# Patient Record
Sex: MALE | Race: WHITE | NOT HISPANIC OR LATINO | Employment: FULL TIME | URBAN - METROPOLITAN AREA
[De-identification: names, ages, dates, MRNs, and addresses within clinical notes are randomized per-mention and may not be internally consistent; named-entity substitution may affect disease eponyms.]

---

## 2018-12-31 ENCOUNTER — HOSPITAL ENCOUNTER (EMERGENCY)
Facility: MEDICAL CENTER | Age: 48
End: 2018-12-31
Attending: EMERGENCY MEDICINE
Payer: COMMERCIAL

## 2018-12-31 ENCOUNTER — APPOINTMENT (OUTPATIENT)
Dept: RADIOLOGY | Facility: MEDICAL CENTER | Age: 48
End: 2018-12-31
Attending: EMERGENCY MEDICINE
Payer: COMMERCIAL

## 2018-12-31 VITALS
HEART RATE: 78 BPM | BODY MASS INDEX: 33.2 KG/M2 | SYSTOLIC BLOOD PRESSURE: 123 MMHG | HEIGHT: 75 IN | WEIGHT: 266.98 LBS | OXYGEN SATURATION: 94 % | TEMPERATURE: 99.2 F | DIASTOLIC BLOOD PRESSURE: 81 MMHG | RESPIRATION RATE: 16 BRPM

## 2018-12-31 DIAGNOSIS — J18.9 PNEUMONIA DUE TO ORGANISM: ICD-10-CM

## 2018-12-31 LAB
ANION GAP SERPL CALC-SCNC: 12 MMOL/L (ref 0–11.9)
BASOPHILS # BLD AUTO: 0.4 % (ref 0–1.8)
BASOPHILS # BLD: 0.03 K/UL (ref 0–0.12)
BUN SERPL-MCNC: 17 MG/DL (ref 8–22)
CALCIUM SERPL-MCNC: 9.3 MG/DL (ref 8.5–10.5)
CHLORIDE SERPL-SCNC: 100 MMOL/L (ref 96–112)
CO2 SERPL-SCNC: 20 MMOL/L (ref 20–33)
CREAT SERPL-MCNC: 1.05 MG/DL (ref 0.5–1.4)
D DIMER PPP IA.FEU-MCNC: 3.51 UG/ML (FEU) (ref 0–0.5)
EKG IMPRESSION: NORMAL
EOSINOPHIL # BLD AUTO: 0.05 K/UL (ref 0–0.51)
EOSINOPHIL NFR BLD: 0.6 % (ref 0–6.9)
ERYTHROCYTE [DISTWIDTH] IN BLOOD BY AUTOMATED COUNT: 38.5 FL (ref 35.9–50)
FLUAV RNA SPEC QL NAA+PROBE: NEGATIVE
FLUBV RNA SPEC QL NAA+PROBE: NEGATIVE
GLUCOSE SERPL-MCNC: 106 MG/DL (ref 65–99)
HCT VFR BLD AUTO: 39.1 % (ref 42–52)
HGB BLD-MCNC: 14.4 G/DL (ref 14–18)
IMM GRANULOCYTES # BLD AUTO: 0.02 K/UL (ref 0–0.11)
IMM GRANULOCYTES NFR BLD AUTO: 0.2 % (ref 0–0.9)
LACTATE BLD-SCNC: 1 MMOL/L (ref 0.5–2)
LYMPHOCYTES # BLD AUTO: 1.84 K/UL (ref 1–4.8)
LYMPHOCYTES NFR BLD: 22 % (ref 22–41)
MCH RBC QN AUTO: 32.8 PG (ref 27–33)
MCHC RBC AUTO-ENTMCNC: 36.8 G/DL (ref 33.7–35.3)
MCV RBC AUTO: 89.1 FL (ref 81.4–97.8)
MONOCYTES # BLD AUTO: 1.01 K/UL (ref 0–0.85)
MONOCYTES NFR BLD AUTO: 12.1 % (ref 0–13.4)
NEUTROPHILS # BLD AUTO: 5.41 K/UL (ref 1.82–7.42)
NEUTROPHILS NFR BLD: 64.7 % (ref 44–72)
NRBC # BLD AUTO: 0 K/UL
NRBC BLD-RTO: 0 /100 WBC
PLATELET # BLD AUTO: 162 K/UL (ref 164–446)
PMV BLD AUTO: 9.8 FL (ref 9–12.9)
POTASSIUM SERPL-SCNC: 3.6 MMOL/L (ref 3.6–5.5)
RBC # BLD AUTO: 4.39 M/UL (ref 4.7–6.1)
SODIUM SERPL-SCNC: 132 MMOL/L (ref 135–145)
TROPONIN I SERPL-MCNC: <0.01 NG/ML (ref 0–0.04)
WBC # BLD AUTO: 8.4 K/UL (ref 4.8–10.8)

## 2018-12-31 PROCEDURE — 71275 CT ANGIOGRAPHY CHEST: CPT

## 2018-12-31 PROCEDURE — 87040 BLOOD CULTURE FOR BACTERIA: CPT | Mod: 91

## 2018-12-31 PROCEDURE — 83605 ASSAY OF LACTIC ACID: CPT

## 2018-12-31 PROCEDURE — 85379 FIBRIN DEGRADATION QUANT: CPT

## 2018-12-31 PROCEDURE — A9270 NON-COVERED ITEM OR SERVICE: HCPCS | Performed by: EMERGENCY MEDICINE

## 2018-12-31 PROCEDURE — 87502 INFLUENZA DNA AMP PROBE: CPT

## 2018-12-31 PROCEDURE — 96365 THER/PROPH/DIAG IV INF INIT: CPT

## 2018-12-31 PROCEDURE — 700117 HCHG RX CONTRAST REV CODE 255: Performed by: EMERGENCY MEDICINE

## 2018-12-31 PROCEDURE — 85025 COMPLETE CBC W/AUTO DIFF WBC: CPT

## 2018-12-31 PROCEDURE — 80048 BASIC METABOLIC PNL TOTAL CA: CPT

## 2018-12-31 PROCEDURE — 96367 TX/PROPH/DG ADDL SEQ IV INF: CPT

## 2018-12-31 PROCEDURE — 36415 COLL VENOUS BLD VENIPUNCTURE: CPT

## 2018-12-31 PROCEDURE — 93005 ELECTROCARDIOGRAM TRACING: CPT | Performed by: EMERGENCY MEDICINE

## 2018-12-31 PROCEDURE — 84484 ASSAY OF TROPONIN QUANT: CPT

## 2018-12-31 PROCEDURE — 99285 EMERGENCY DEPT VISIT HI MDM: CPT

## 2018-12-31 PROCEDURE — 700105 HCHG RX REV CODE 258: Performed by: EMERGENCY MEDICINE

## 2018-12-31 PROCEDURE — 700102 HCHG RX REV CODE 250 W/ 637 OVERRIDE(OP): Performed by: EMERGENCY MEDICINE

## 2018-12-31 PROCEDURE — 700111 HCHG RX REV CODE 636 W/ 250 OVERRIDE (IP): Performed by: EMERGENCY MEDICINE

## 2018-12-31 PROCEDURE — 71046 X-RAY EXAM CHEST 2 VIEWS: CPT

## 2018-12-31 RX ORDER — DOXYCYCLINE 100 MG/1
100 CAPSULE ORAL 2 TIMES DAILY
Qty: 10 CAP | Refills: 0 | Status: SHIPPED | OUTPATIENT
Start: 2018-12-31 | End: 2019-01-05

## 2018-12-31 RX ORDER — IBUPROFEN 600 MG/1
600 TABLET ORAL ONCE
Status: COMPLETED | OUTPATIENT
Start: 2018-12-31 | End: 2018-12-31

## 2018-12-31 RX ORDER — SODIUM CHLORIDE 9 MG/ML
1000 INJECTION, SOLUTION INTRAVENOUS ONCE
Status: COMPLETED | OUTPATIENT
Start: 2018-12-31 | End: 2018-12-31

## 2018-12-31 RX ORDER — ACETAMINOPHEN 325 MG/1
650 TABLET ORAL ONCE
Status: COMPLETED | OUTPATIENT
Start: 2018-12-31 | End: 2018-12-31

## 2018-12-31 RX ORDER — AZITHROMYCIN 500 MG/1
500 INJECTION, POWDER, LYOPHILIZED, FOR SOLUTION INTRAVENOUS ONCE
Status: COMPLETED | OUTPATIENT
Start: 2018-12-31 | End: 2018-12-31

## 2018-12-31 RX ADMIN — AZITHROMYCIN MONOHYDRATE 500 MG: 500 INJECTION, POWDER, LYOPHILIZED, FOR SOLUTION INTRAVENOUS at 20:20

## 2018-12-31 RX ADMIN — IBUPROFEN 600 MG: 600 TABLET, FILM COATED ORAL at 18:15

## 2018-12-31 RX ADMIN — SODIUM CHLORIDE 3 G: 900 INJECTION INTRAVENOUS at 19:38

## 2018-12-31 RX ADMIN — IOHEXOL 65 ML: 350 INJECTION, SOLUTION INTRAVENOUS at 20:13

## 2018-12-31 RX ADMIN — ACETAMINOPHEN 650 MG: 325 TABLET, FILM COATED ORAL at 18:15

## 2018-12-31 RX ADMIN — SODIUM CHLORIDE 1000 ML: 9 INJECTION, SOLUTION INTRAVENOUS at 18:16

## 2018-12-31 ASSESSMENT — PAIN SCALES - GENERAL: PAINLEVEL_OUTOF10: 6

## 2019-01-01 NOTE — ED NOTES
D/C instructions provided to patient at bedside, verbalizes understanding and states plans for follow-up with PCP as recommended.   Scripts given  IV's removed and dressed.   All belongings accounted for, all questions answered at this time.   Family to drive patient home.

## 2019-01-01 NOTE — ED NOTES
Pt reports being seen by ED in [place] where they told him his lungs were clear and it was a virus and if he took mucinex and dayquill it'd work its self out fine.

## 2019-01-01 NOTE — ED NOTES
"Pt reports \"severly dehydrated, sick for a week, blood in my urine\" ... \"just a whole lot of things\"   "

## 2019-01-01 NOTE — ED PROVIDER NOTES
"ED Provider Note    Scribed for Ori Shirley M.D. by Dana Laird. 12/31/2018, 5:32 PM.    Primary care provider: No primary care provider on file.  Means of arrival: walk in   History obtained from: Patient  History limited by: none     CHIEF COMPLAINT  Chief Complaint   Patient presents with   • Cough     productive cough since willie. states coughing up greenish sputum.   • Nasal Congestion   • Muscle Ache       HPI  Nehemiah Abbasi is a 48 y.o. male who presents to the ED with complaints of an acute, persistent cough onset 5 days ago. He reports that he was seen at an emergency department in [place] with symptoms of cough, nasal congestion, shortness of breath, wheezing and chest pains. There he had a normal EKG and chest x-ray and was told he had a viral syndrome. The physician there instructed him to take Mucinex and Nyquil. Since then, his symptoms have worsened and he has new symptoms of headaches, hematuria, increased thirst and increased fatigue. The patient notes that he feels dehydrated. He is a smoker. The patient adds that he has been drug free for 11 years. Recent long distance travel by car from [place] is noted.     REVIEW OF SYSTEMS  See HPI for further details. All other systems are negative.     PAST MEDICAL HISTORY       SURGICAL HISTORY  patient denies any surgical history    SOCIAL HISTORY  Social History   Substance Use Topics   • Smoking status: Not on file   • Smokeless tobacco: Not on file   • Alcohol use Not on file      History   Drug use: Unknown       FAMILY HISTORY  No family history on file.    CURRENT MEDICATIONS  Reviewed.  See Encounter Summary.     ALLERGIES  No Known Allergies    PHYSICAL EXAM  VITAL SIGNS: /81   Pulse (!) 104   Temp 37.3 °C (99.2 °F) (Temporal)   Resp 18   Ht 1.9 m (6' 2.8\")   Wt 121.1 kg (266 lb 15.6 oz)   SpO2 92%   BMI 33.55 kg/m²   Constitutional: Alert in no apparent distress.  HENT: No signs of trauma, Bilateral external ears " normal, Nasal congestion   Eyes: Pupils are equal and reactive, Conjunctiva normal, Non-icteric.   Neck: Normal range of motion, No tenderness, Supple, No stridor.   Lymphatic: No lymphadenopathy noted.   Cardiovascular: Regular rate and rhythm, no murmurs.   Thorax & Lungs: Scattered rhonchi and wheezing, No respiratory distress, No chest tenderness.   Abdomen: Bowel sounds normal, Soft, No tenderness, No masses, No pulsatile masses. No peritoneal signs.  Skin: Warm, Dry, No erythema, No rash.   Back: No bony tenderness, No CVA tenderness.   Extremities: Intact distal pulses, No edema, No tenderness, No cyanosis  Musculoskeletal: Good range of motion in all major joints. No tenderness to palpation or major deformities noted.   Neurologic: Alert , Normal motor function, Normal sensory function, No focal deficits noted.   Psychiatric: Affect normal, Judgment normal, Mood normal.     DIAGNOSTIC STUDIES / PROCEDURES     LABS  Results for orders placed or performed during the hospital encounter of 12/31/18   CBC WITH DIFFERENTIAL   Result Value Ref Range    WBC 8.4 4.8 - 10.8 K/uL    RBC 4.39 (L) 4.70 - 6.10 M/uL    Hemoglobin 14.4 14.0 - 18.0 g/dL    Hematocrit 39.1 (L) 42.0 - 52.0 %    MCV 89.1 81.4 - 97.8 fL    MCH 32.8 27.0 - 33.0 pg    MCHC 36.8 (H) 33.7 - 35.3 g/dL    RDW 38.5 35.9 - 50.0 fL    Platelet Count 162 (L) 164 - 446 K/uL    MPV 9.8 9.0 - 12.9 fL    Neutrophils-Polys 64.70 44.00 - 72.00 %    Lymphocytes 22.00 22.00 - 41.00 %    Monocytes 12.10 0.00 - 13.40 %    Eosinophils 0.60 0.00 - 6.90 %    Basophils 0.40 0.00 - 1.80 %    Immature Granulocytes 0.20 0.00 - 0.90 %    Nucleated RBC 0.00 /100 WBC    Neutrophils (Absolute) 5.41 1.82 - 7.42 K/uL    Lymphs (Absolute) 1.84 1.00 - 4.80 K/uL    Monos (Absolute) 1.01 (H) 0.00 - 0.85 K/uL    Eos (Absolute) 0.05 0.00 - 0.51 K/uL    Baso (Absolute) 0.03 0.00 - 0.12 K/uL    Immature Granulocytes (abs) 0.02 0.00 - 0.11 K/uL    NRBC (Absolute) 0.00 K/uL   BASIC  METABOLIC PANEL   Result Value Ref Range    Sodium 132 (L) 135 - 145 mmol/L    Potassium 3.6 3.6 - 5.5 mmol/L    Chloride 100 96 - 112 mmol/L    Co2 20 20 - 33 mmol/L    Glucose 106 (H) 65 - 99 mg/dL    Bun 17 8 - 22 mg/dL    Creatinine 1.05 0.50 - 1.40 mg/dL    Calcium 9.3 8.5 - 10.5 mg/dL    Anion Gap 12.0 (H) 0.0 - 11.9   TROPONIN   Result Value Ref Range    Troponin I <0.01 0.00 - 0.04 ng/mL   D-DIMER   Result Value Ref Range    D-Dimer Screen 3.51 (H) 0.00 - 0.50 ug/mL (FEU)   Influenza A/B By PCR   Result Value Ref Range    Influenza virus A RNA Negative Negative    Influenza virus B, PCR Negative Negative   ESTIMATED GFR   Result Value Ref Range    GFR If African American >60 >60 mL/min/1.73 m 2    GFR If Non African American >60 >60 mL/min/1.73 m 2   LACTIC ACID   Result Value Ref Range    Lactic Acid 1.0 0.5 - 2.0 mmol/L   EKG (NOW)   Result Value Ref Range    Report       [facility]    Test Date:  2018  Pt Name:    SHAUN GONZALEZ               Department: ER  MRN:        [id number]                      Room:       [facility]  Gender:     Male                         Technician: [id number]  :        1970                   Requested By:MARS LEYVA  Order #:    [id number]                    Reading MD:    Measurements  Intervals                                Axis  Rate:       87                           P:          63  FL:         172                          QRS:        68  QRSD:       90                           T:          36  QT:         332  QTc:        400    Interpretive Statements  SINUS RHYTHM  No previous ECG available for comparison         All labs were reviewed by me.    EKG  12 Lead EKG interpreted by me to show:  Sinus rhythm  Rate 87  Axis: Normal  Intervals: Normal  Diffuse non specific ST-T wave changes  S1, flat T3  No prior EKG for comparison     RADIOLOGY  CT-CTA CHEST PULMONARY ARTERY W/ RECONS   Final Result      1.  Limited evaluation of the  lower lobes secondary to respiratory motion.      2.  No pulmonary embolus is identified.      3.  Peribronchial thickening/inflammation with reticulation and groundglass density in the lower lobes, consistent with an infectious or inflammatory process.      4.  4 mm left upper lobe nodule. If the patient is at high risk for malignancy, consider follow-up low dose CT in 12 months      5.  Nonspecific mediastinal and right hilar lymph node enlargement, likely reactive.      6.  Small hiatal hernia.      7.  Hepatic steatosis            DX-CHEST-2 VIEWS   Final Result         Patchy bibasilar opacities, left more than right, concerning for infection.        The radiologist's interpretation of all radiological studies and images have been reviewed by me.    COURSE & MEDICAL DECISION MAKING  Pertinent Labs & Imaging studies reviewed. (See chart for details)      5:32 PM - Patient seen and examined at bedside. Patient will be treated with tylenol 650 mg, motrin 600 mg. Ordered troponin, d-dimer, influenza a/b, DX chest, EKG, CBC with differential, BMP to evaluate his symptoms.     HYDRATION: Based on the patient's presentation of Tachycardia and dehydration the patient was given IV fluids. IV Hydration was used because oral hydration was not adequate alone. Upon recheck following hydration, the patient was still feeling unwell with fevers and chills.      6:56 PM - I reevaluated patient at bedside and discussed diagnostic study results. I also discussed the plan for CT chest . I also ordered Unasyn 3 g and Zithromax 500 mg.     9:17 PM - I reevaluated patient at bedside and discussed diagnostic study results. I also discussed the plan for discharge as outlined below.       Decision Making:  This is a 48 y.o. year old male who presents with above complaint.  Progressively worsening symptoms as per presenting back to the emergency department for reevaluation.  Seen in [place] and told likely viral syndrome.  Today  evidence of bibasilar pneumonia.  Given elevated d-dimer [place] I did complete a CT which did not show any evidence of pulmonary embolus.  Likely infectious process again identified.  Furthermore the patient had a nodule which she is understanding he will need to follow-up with.  She is attending outpatient follow-up both for this current illness as well as the pulmonary nodule for screening.    FINAL IMPRESSION  1. Pneumonia due to organism        PRESCRIPTIONS  Discharge Medication List as of 12/31/2018  9:16 PM      START taking these medications    Details   doxycycline (MONODOX) 100 MG capsule Take 1 Cap by mouth 2 times a day for 5 days., Disp-10 Cap, R-0, Print Rx Paper             FOLLOW UP  [facility], Emergency Dept  [address] [address] [phone]    If symptoms worsen; f/u with Dr. Gross        -DISCHARGE-      FINAL IMPRESSION  1. Pneumonia due to organism       2.  Pulmonary nodule     I, Dana Laird (Scribe), am scribing for, and in the presence of, Ori Shirley M.D..    Electronically signed by: Dana Laird (Scribe), 12/31/2018    I, Ori Shirley M.D. personally performed the services described in this documentation, as scribed by Dana Laird in my presence, and it is both accurate and complete.    C    The note accurately reflects work and decisions made by me.  Ori Shirley  1/1/2019  12:19 AM

## 2019-01-01 NOTE — ED NOTES
Pt ambulated to restroom with steady gait. Pt maintained O2 saturation between 90-94% on RA. Pt denies SOB.  Pt placed back on monitor and reconnected to Azithromycin drip (see MAR).

## 2019-01-01 NOTE — ED TRIAGE NOTES
Chief Complaint   Patient presents with   • Cough     productive cough since willie. states coughing up greenish sputum.   • Nasal Congestion   • Muscle Ache     Also states that he feels dehydrated and states urine appears w/a little bit of blood. Denies dysuria. Triage process explained. Instructed to notify staff for any worsening symptoms. NAD. Able to speak in full sentences.

## 2019-01-01 NOTE — ED NOTES
Patient returned from imaging  NS and unasyn stopped.   Azithromycin started (see MAR).   No other needs at this time.

## 2019-01-05 LAB
BACTERIA BLD CULT: NORMAL
BACTERIA BLD CULT: NORMAL
SIGNIFICANT IND 70042: NORMAL
SIGNIFICANT IND 70042: NORMAL
SITE SITE: NORMAL
SITE SITE: NORMAL
SOURCE SOURCE: NORMAL
SOURCE SOURCE: NORMAL

## 2021-06-05 ENCOUNTER — HOSPITAL ENCOUNTER (EMERGENCY)
Facility: MEDICAL CENTER | Age: 51
End: 2021-06-05
Attending: EMERGENCY MEDICINE
Payer: COMMERCIAL

## 2021-06-05 VITALS
SYSTOLIC BLOOD PRESSURE: 117 MMHG | DIASTOLIC BLOOD PRESSURE: 71 MMHG | HEART RATE: 59 BPM | TEMPERATURE: 97.3 F | HEIGHT: 74 IN | OXYGEN SATURATION: 96 % | WEIGHT: 270 LBS | RESPIRATION RATE: 16 BRPM | BODY MASS INDEX: 34.65 KG/M2

## 2021-06-05 DIAGNOSIS — S39.012A STRAIN OF LUMBAR REGION, INITIAL ENCOUNTER: ICD-10-CM

## 2021-06-05 PROCEDURE — 96375 TX/PRO/DX INJ NEW DRUG ADDON: CPT

## 2021-06-05 PROCEDURE — 700111 HCHG RX REV CODE 636 W/ 250 OVERRIDE (IP): Performed by: EMERGENCY MEDICINE

## 2021-06-05 PROCEDURE — 99285 EMERGENCY DEPT VISIT HI MDM: CPT

## 2021-06-05 PROCEDURE — 96374 THER/PROPH/DIAG INJ IV PUSH: CPT

## 2021-06-05 RX ORDER — PREDNISONE 20 MG/1
60 TABLET ORAL DAILY
Status: DISCONTINUED | OUTPATIENT
Start: 2021-06-05 | End: 2021-06-05 | Stop reason: HOSPADM

## 2021-06-05 RX ORDER — VARENICLINE TARTRATE 1 MG/1
1 TABLET, FILM COATED ORAL 2 TIMES DAILY
COMMUNITY

## 2021-06-05 RX ORDER — PREDNISONE 20 MG/1
40 TABLET ORAL DAILY
Qty: 10 TABLET | Refills: 0 | Status: SHIPPED | OUTPATIENT
Start: 2021-06-05 | End: 2021-06-10

## 2021-06-05 RX ORDER — KETOROLAC TROMETHAMINE 30 MG/ML
30 INJECTION, SOLUTION INTRAMUSCULAR; INTRAVENOUS ONCE
Status: COMPLETED | OUTPATIENT
Start: 2021-06-05 | End: 2021-06-05

## 2021-06-05 RX ORDER — HYDROMORPHONE HYDROCHLORIDE 1 MG/ML
1 INJECTION, SOLUTION INTRAMUSCULAR; INTRAVENOUS; SUBCUTANEOUS ONCE
Status: COMPLETED | OUTPATIENT
Start: 2021-06-05 | End: 2021-06-05

## 2021-06-05 RX ORDER — OXYCODONE AND ACETAMINOPHEN 10; 325 MG/1; MG/1
1 TABLET ORAL EVERY 6 HOURS PRN
Qty: 16 TABLET | Refills: 0 | Status: SHIPPED | OUTPATIENT
Start: 2021-06-05 | End: 2021-06-09

## 2021-06-05 RX ADMIN — HYDROMORPHONE HYDROCHLORIDE 1 MG: 1 INJECTION, SOLUTION INTRAMUSCULAR; INTRAVENOUS; SUBCUTANEOUS at 12:55

## 2021-06-05 RX ADMIN — KETOROLAC TROMETHAMINE 30 MG: 30 INJECTION, SOLUTION INTRAMUSCULAR; INTRAVENOUS at 12:55

## 2021-06-05 RX ADMIN — PREDNISONE 60 MG: 20 TABLET ORAL at 12:55

## 2021-06-05 ASSESSMENT — ENCOUNTER SYMPTOMS
HEADACHES: 0
FEVER: 0
ABDOMINAL PAIN: 0
NAUSEA: 0
BACK PAIN: 1
VOMITING: 0
DIZZINESS: 0
SHORTNESS OF BREATH: 0
FALLS: 0
COUGH: 1

## 2021-06-05 NOTE — ED PROVIDER NOTES
"ED Provider Note    ED Provider Note    Primary care provider: No primary care provider on file.  Means of arrival: EMS  History obtained from: Patient  History limited by: None    CHIEF COMPLAINT  Chief Complaint   Patient presents with   • Low Back Pain     right sided,  \"heard popping\" after lifting heavy furiture yesterday       HPI  Nehemiah Abbasi is a 51 y.o. male who presents to the Emergency Department via EMS with a chief complaint of back pain, this is low back pain, right greater than left.  He has a history of similar symptoms although he is never has been in the emergency department for pain.  He states 30 years ago, he fell off a 12 foot ladder and injured his back and he has had problems since then but he manages them at home.  He often will do certain things and he will know that he can expect to be sore for the next few days.  Typically, he is able to slow down, rest and do some gentle range of motion and that will be all that he needs.  He has made use of anti-inflammatories in the past.  Yesterday, he was at his workplace and he states that a large, open cabinet with liquor on it, fell onto an employee and he reflexively grabbed it to prevent the employee being injured and he he heard a \"pop in his back\" and knew that he was going to have problems with pain.  He does a lot of driving for work.  Yesterday per usual, he got back into his car to drive home which typically takes about 3 hours and he states it took an additional 2 hours because he had to stop frequently and get out and walk around.  Patient was in his normal state of health prior to this incident.  He states he has a chronic cough due to his smoking and that he always has \"tingling in his throat\" this is unchanged.  He denies fever cough or cold symptoms.  He denies bowel or bladder incontinence or retention.  He does not have radiation of pain into his legs.  He reports initially, his pain was 12/10, after medicated by EMS, " "decreased to 5 out of 10 and now, he states is about 7-8 out of 10 and increases with any movement or cough.  He was unable to stand at home secondary to pain, prompting a call to 911.    REVIEW OF SYSTEMS  Review of Systems   Constitutional: Negative for fever.   HENT: Negative for congestion.    Respiratory: Positive for cough. Negative for shortness of breath.         Chronic   Gastrointestinal: Negative for abdominal pain, nausea and vomiting.   Genitourinary: Negative for dysuria and urgency.   Musculoskeletal: Positive for back pain. Negative for falls.   Neurological: Negative for dizziness and headaches.       PAST MEDICAL HISTORY   Chronic intermittent back pain, chronic smoker's cough    SURGICAL HISTORY   has a past surgical history that includes carpal tunnel release.    SOCIAL HISTORY  Social History     Tobacco Use   • Smoking status: Current Every Day Smoker     Types: Cigarettes   • Smokeless tobacco: Never Used   Vaping Use   • Vaping Use: Never used   Substance Use Topics   • Alcohol use: Not Currently   • Drug use: Not Currently      Social History     Substance and Sexual Activity   Drug Use Not Currently       FAMILY HISTORY  History reviewed. No pertinent family history.    CURRENT MEDICATIONS  Home Medications     Reviewed by Nicole Guerrero R.N. (Registered Nurse) on 06/05/21 at 1110  Med List Status: Partial   Medication Last Dose Status   varenicline (CHANTIX CONTINUING MONTH SAUD) 1 MG tablet  Active                ALLERGIES  No Known Allergies    PHYSICAL EXAM  VITAL SIGNS: /71   Pulse (!) 59   Temp 36.3 °C (97.3 °F) (Temporal)   Resp 16   Ht 1.88 m (6' 2\")   Wt 122 kg (270 lb)   SpO2 96%   BMI 34.67 kg/m²   Vitals reviewed.  Constitutional: Patient is oriented to person, place, and time. Appears well-developed and well-nourished.  Mild distress.    Head: Normocephalic and atraumatic.  Mouth/Throat: Oropharynx is clear and moist  Eyes: Conjunctivae are normal. Pupils are " equal and round.  Neck: Normal range of motion. Neck supple.  Cardiovascular: Normal rate, regular rhythm and normal heart sounds. Normal peripheral pulses, bilateral lower extremities.  Pulmonary/Chest: Effort normal and breath sounds normal. No respiratory distress, no wheezes, rhonchi, or rales.   Abdominal: Soft. Bowel sounds are normal. There is no tenderness. No rebound or guarding, or peritoneal signs. No CVA tenderness.  Musculoskeletal: No edema and no tenderness. Tenderness to palpation without overlying skin changes to the right lumbar paraspinal muscles.  There is also tenderness to palpation over the upper right buttock, and to a lesser extent these areas on the left side.  There is a positive straight leg raise on the right at about 15 degrees.  The left patient is able to flex up with straight leg raise to approximately 60 degrees.    Neurological: No focal deficits. No motor deficits. Tingling, paresthesias to bilateral feet, unchanged  Skin: Skin is warm and dry. No erythema. No pallor.   Psychiatric: Patient has a normal mood and affect.     COURSE & MEDICAL DECISION MAKING  Pertinent Labs & Imaging studies reviewed. (See chart for details)    Obtained and reviewed past medical records. Patients last encounter was in 2018 he was seen for cough and nasal congestion with muscle aches. He was diagnosed with pneumonia.    11:26 AM - Patient seen and examined at bedside.  This is a pleasant 10 overall previously healthy 51-year-old male who presents via EMS as he was unable to stand up and walk at home due to severe pain.  He had some relief with morphine.  He does not have any bowel or bladder incontinence.  No significant trauma to suggest need for imaging for fracture.  I suspect this is likely soft tissue.  He does not have red flag symptoms to suggest infectious etiology or cauda equina syndrome.  He will be treated with anti-inflammatories and pain medication.    2:45 PM patient's reevaluated the  bedside.  He was able to walk, get up out of bed on his own here in the emergency department and is feeling markedly better.  At this point, I feel he can safely be discharged home.  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  He will be discharged home with a short course of pain medication.   was checked.  There is no concerning prescription filling pattern.  He will also be discharged home with a short course of prednisone.  He is advised on avoiding heavy lifting or bending and applying ice to the area, avoiding heat.  At this point, I do not see indication for emergent imaging.  However, he is given strict return precautions.  If this changes if he develops a fever or new neurologic deficits, changes in his bowel or bladder control, he should return immediately for reevaluation and he is agreeable to this plan of care.    FINAL IMPRESSION  1. Strain of lumbar region, initial encounter

## 2021-06-05 NOTE — ED TRIAGE NOTES
"Nehemiah Abbasi  Chief Complaint   Patient presents with   • Low Back Pain     right sided,  \"heard popping\" after lifting heavy furiture yesterday     Pt biba from home,  Was unable to stand this morning after hearing a \"popping\" sounds in back last night while lifting heavy furniture.  CMS intact,  Pain with movement.  5mg IV morphine given PTA.      On monitor.  Chart up for ERP.    Call light in reach  "

## 2021-06-05 NOTE — ED NOTES
Assumed care of pt, pt medicated as ordered, Continuous pulse ox in place, pt updated on plan of care

## 2021-06-05 NOTE — ED NOTES
Pt discharged with instructions and script. X 2.  Pt verbalizes the understanding of instructions. Pt ambulated out of ER without any difficulty. Pt dc'd to wife

## 2021-06-05 NOTE — DISCHARGE INSTRUCTIONS
I would recommend, decreased activity for the next 3 days.  Gentle range of motion exercises.  No long periods of sitting or standing.

## 2021-08-13 ENCOUNTER — APPOINTMENT (OUTPATIENT)
Dept: RADIOLOGY | Facility: MEDICAL CENTER | Age: 51
End: 2021-08-13
Attending: EMERGENCY MEDICINE
Payer: COMMERCIAL

## 2021-08-13 ENCOUNTER — HOSPITAL ENCOUNTER (EMERGENCY)
Facility: MEDICAL CENTER | Age: 51
End: 2021-08-13
Attending: EMERGENCY MEDICINE
Payer: COMMERCIAL

## 2021-08-13 VITALS
BODY MASS INDEX: 35.94 KG/M2 | WEIGHT: 280 LBS | SYSTOLIC BLOOD PRESSURE: 114 MMHG | OXYGEN SATURATION: 94 % | TEMPERATURE: 97.5 F | RESPIRATION RATE: 18 BRPM | DIASTOLIC BLOOD PRESSURE: 56 MMHG | HEART RATE: 72 BPM | HEIGHT: 74 IN

## 2021-08-13 DIAGNOSIS — R91.1 LUNG NODULE: ICD-10-CM

## 2021-08-13 DIAGNOSIS — D69.6 THROMBOCYTOPENIA (HCC): ICD-10-CM

## 2021-08-13 DIAGNOSIS — K76.0 HEPATIC STEATOSIS: ICD-10-CM

## 2021-08-13 DIAGNOSIS — R73.09 ELEVATED GLUCOSE: ICD-10-CM
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
## 2021-08-13 LAB
ABO GROUP BLD: NORMAL
ALBUMIN SERPL BCP-MCNC: 4.1 G/DL (ref 3.2–4.9)
ALBUMIN/GLOB SERPL: 1.3 G/DL
ALP SERPL-CCNC: 69 U/L (ref 30–99)
ALT SERPL-CCNC: 37 U/L (ref 2–50)
ANION GAP SERPL CALC-SCNC: 11 MMOL/L (ref 7–16)
APTT PPP: 24.6 SEC (ref 24.7–36)
AST SERPL-CCNC: 27 U/L (ref 12–45)
BILIRUB SERPL-MCNC: 0.6 MG/DL (ref 0.1–1.5)
BLD GP AB SCN SERPL QL: NORMAL
BUN SERPL-MCNC: 19 MG/DL (ref 8–22)
CALCIUM SERPL-MCNC: 9 MG/DL (ref 8.5–10.5)
CHLORIDE SERPL-SCNC: 105 MMOL/L (ref 96–112)
CO2 SERPL-SCNC: 22 MMOL/L (ref 20–33)
CREAT SERPL-MCNC: 1.19 MG/DL (ref 0.5–1.4)
ERYTHROCYTE [DISTWIDTH] IN BLOOD BY AUTOMATED COUNT: 45.3 FL (ref 35.9–50)
ETHANOL BLD-MCNC: <10.1 MG/DL (ref 0–10)
GLOBULIN SER CALC-MCNC: 3.1 G/DL (ref 1.9–3.5)
GLUCOSE SERPL-MCNC: 149 MG/DL (ref 65–99)
HCT VFR BLD AUTO: 43.6 % (ref 42–52)
HGB BLD-MCNC: 15.4 G/DL (ref 14–18)
INR PPP: 1.13 (ref 0.87–1.13)
MCH RBC QN AUTO: 32.6 PG (ref 27–33)
MCHC RBC AUTO-ENTMCNC: 35.3 G/DL (ref 33.7–35.3)
MCV RBC AUTO: 92.2 FL (ref 81.4–97.8)
PLATELET # BLD AUTO: 141 K/UL (ref 164–446)
PMV BLD AUTO: 10.2 FL (ref 9–12.9)
POTASSIUM SERPL-SCNC: 4 MMOL/L (ref 3.6–5.5)
PROT SERPL-MCNC: 7.2 G/DL (ref 6–8.2)
PROTHROMBIN TIME: 14.1 SEC (ref 12–14.6)
RBC # BLD AUTO: 4.73 M/UL (ref 4.7–6.1)
RH BLD: NORMAL
SODIUM SERPL-SCNC: 138 MMOL/L (ref 135–145)
WBC # BLD AUTO: 5.8 K/UL (ref 4.8–10.8)

## 2021-08-13 PROCEDURE — 72125 CT NECK SPINE W/O DYE: CPT

## 2021-08-13 PROCEDURE — 700117 HCHG RX CONTRAST REV CODE 255: Performed by: EMERGENCY MEDICINE

## 2021-08-13 PROCEDURE — 80053 COMPREHEN METABOLIC PANEL: CPT

## 2021-08-13 PROCEDURE — 305948 HCHG GREEN TRAUMA ACT PRE-NOTIFY NO CC

## 2021-08-13 PROCEDURE — 73560 X-RAY EXAM OF KNEE 1 OR 2: CPT | Mod: LT

## 2021-08-13 PROCEDURE — 90471 IMMUNIZATION ADMIN: CPT

## 2021-08-13 PROCEDURE — 73070 X-RAY EXAM OF ELBOW: CPT | Mod: RT

## 2021-08-13 PROCEDURE — 71260 CT THORAX DX C+: CPT

## 2021-08-13 PROCEDURE — 99285 EMERGENCY DEPT VISIT HI MDM: CPT

## 2021-08-13 PROCEDURE — 86901 BLOOD TYPING SEROLOGIC RH(D): CPT

## 2021-08-13 PROCEDURE — 86900 BLOOD TYPING SEROLOGIC ABO: CPT

## 2021-08-13 PROCEDURE — 90715 TDAP VACCINE 7 YRS/> IM: CPT | Performed by: EMERGENCY MEDICINE

## 2021-08-13 PROCEDURE — 82077 ASSAY SPEC XCP UR&BREATH IA: CPT

## 2021-08-13 PROCEDURE — 72128 CT CHEST SPINE W/O DYE: CPT

## 2021-08-13 PROCEDURE — 700111 HCHG RX REV CODE 636 W/ 250 OVERRIDE (IP): Performed by: EMERGENCY MEDICINE

## 2021-08-13 PROCEDURE — 86850 RBC ANTIBODY SCREEN: CPT

## 2021-08-13 PROCEDURE — 72170 X-RAY EXAM OF PELVIS: CPT

## 2021-08-13 PROCEDURE — 71045 X-RAY EXAM CHEST 1 VIEW: CPT

## 2021-08-13 PROCEDURE — 72131 CT LUMBAR SPINE W/O DYE: CPT

## 2021-08-13 PROCEDURE — 85610 PROTHROMBIN TIME: CPT

## 2021-08-13 PROCEDURE — 85730 THROMBOPLASTIN TIME PARTIAL: CPT

## 2021-08-13 PROCEDURE — 85027 COMPLETE CBC AUTOMATED: CPT

## 2021-08-13 RX ADMIN — IOHEXOL 100 ML: 350 INJECTION, SOLUTION INTRAVENOUS at 17:09

## 2021-08-13 RX ADMIN — CLOSTRIDIUM TETANI TOXOID ANTIGEN (FORMALDEHYDE INACTIVATED), CORYNEBACTERIUM DIPHTHERIAE TOXOID ANTIGEN (FORMALDEHYDE INACTIVATED), BORDETELLA PERTUSSIS TOXOID ANTIGEN (GLUTARALDEHYDE INACTIVATED), BORDETELLA PERTUSSIS FILAMENTOUS HEMAGGLUTININ ANTIGEN (FORMALDEHYDE INACTIVATED), BORDETELLA PERTUSSIS PERTACTIN ANTIGEN, AND BORDETELLA PERTUSSIS FIMBRIAE 2/3 ANTIGEN 0.5 ML: 5; 2; 2.5; 5; 3; 5 INJECTION, SUSPENSION INTRAMUSCULAR at 17:48

## 2021-08-13 ASSESSMENT — PAIN SCALES - WONG BAKER: WONGBAKER_NUMERICALRESPONSE: HURTS EVEN MORE
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
## 2021-08-14 NOTE — ED NOTES
Reviewed discharge paperwork with pt. Pt VSS. Ambulatory. Verbalized understanding. Discharged with wife.

## 2021-08-14 NOTE — DISCHARGE INSTRUCTIONS
Return to the ER for vomiting, difficulty breathing, or other concerns    Take Tylenol and/or ibuprofen as needed for pain    Follow-up with your primary care provider next week regarding your lab and imaging abnormalities that we discussed, including the nodule in your lung which will need repeat CT imaging.    Please consider quitting smoking
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
## 2021-08-14 NOTE — ED NOTES
Wound cleansed and irrigated. Pt instructed to put antibiotic ointment on wound once home. Pt verbalized understanding. Pt able to ambulate and maintain PO fluids.

## 2021-09-01 PROCEDURE — 99283 EMERGENCY DEPT VISIT LOW MDM: CPT

## 2021-09-01 ASSESSMENT — FIBROSIS 4 INDEX: FIB4 SCORE: 1.54

## 2021-09-02 ENCOUNTER — HOSPITAL ENCOUNTER (EMERGENCY)
Facility: MEDICAL CENTER | Age: 51
End: 2021-09-02
Attending: EMERGENCY MEDICINE

## 2021-09-02 VITALS
HEIGHT: 74 IN | WEIGHT: 277.78 LBS | SYSTOLIC BLOOD PRESSURE: 132 MMHG | OXYGEN SATURATION: 95 % | TEMPERATURE: 98 F | BODY MASS INDEX: 35.65 KG/M2 | DIASTOLIC BLOOD PRESSURE: 80 MMHG | HEART RATE: 70 BPM | RESPIRATION RATE: 18 BRPM

## 2021-09-02 DIAGNOSIS — U07.1 PNEUMONIA DUE TO COVID-19 VIRUS: ICD-10-CM

## 2021-09-02 DIAGNOSIS — J12.82 PNEUMONIA DUE TO COVID-19 VIRUS: ICD-10-CM

## 2021-09-02 PROCEDURE — 700102 HCHG RX REV CODE 250 W/ 637 OVERRIDE(OP): Performed by: EMERGENCY MEDICINE

## 2021-09-02 PROCEDURE — A9270 NON-COVERED ITEM OR SERVICE: HCPCS | Performed by: EMERGENCY MEDICINE

## 2021-09-02 RX ORDER — DEXAMETHASONE 6 MG/1
6 TABLET ORAL DAILY
Qty: 10 TABLET | Refills: 0 | Status: SHIPPED | OUTPATIENT
Start: 2021-09-02 | End: 2021-09-12

## 2021-09-02 RX ORDER — DEXAMETHASONE 4 MG/1
6 TABLET ORAL DAILY
Status: DISCONTINUED | OUTPATIENT
Start: 2021-09-02 | End: 2021-09-02 | Stop reason: HOSPADM

## 2021-09-02 RX ORDER — ALBUTEROL SULFATE 90 UG/1
2 AEROSOL, METERED RESPIRATORY (INHALATION) EVERY 4 HOURS PRN
Qty: 18 G | Refills: 0 | Status: SHIPPED | OUTPATIENT
Start: 2021-09-02

## 2021-09-02 RX ADMIN — DEXAMETHASONE 6 MG: 4 TABLET ORAL at 05:09

## 2021-09-02 NOTE — ED NOTES
Pt Dced at this time. AAOx4, VSS, ambulating with steady gait. Education provided on new RX, f/u care, and all questions addressed. Pt verbalized understanding and ambulated off unit, NADN.

## 2021-09-02 NOTE — ED TRIAGE NOTES
"Chief Complaint   Patient presents with   • Flu Like Symptoms     Patient tested positive for Covid. States that he has a friend that has been admitted for the same. Patient reports that he is feeling progressively worse, says \"it feels like my lungs are on fire\".   "
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
